# Patient Record
Sex: FEMALE | Race: WHITE | NOT HISPANIC OR LATINO | URBAN - METROPOLITAN AREA
[De-identification: names, ages, dates, MRNs, and addresses within clinical notes are randomized per-mention and may not be internally consistent; named-entity substitution may affect disease eponyms.]

---

## 2022-06-27 NOTE — PROCEDURE NOTE: CLINICAL
PROCEDURE NOTE: Botox Specialty All 4 Lids. Diagnosis: Blepharospasm. Prior to treatment, the risks/benefits/alternatives were discussed. The patient wished to proceed with procedure. Refer to template for location and amounts of injections. Lot #: null. Expiration Date: . EXP. Inventory Id: null. Total Units Used: *. Patient tolerated procedure well. There were no complications. Post procedure instructions given. Mohinder Wagner

## 2022-07-09 ENCOUNTER — TELEPHONE ENCOUNTER (OUTPATIENT)
Dept: URBAN - METROPOLITAN AREA CLINIC 121 | Facility: CLINIC | Age: 87
End: 2022-07-09

## 2022-07-10 ENCOUNTER — TELEPHONE ENCOUNTER (OUTPATIENT)
Dept: URBAN - METROPOLITAN AREA CLINIC 121 | Facility: CLINIC | Age: 87
End: 2022-07-10

## 2022-08-11 NOTE — PATIENT DISCUSSION
All looks good after botox. Patient states that eye feels like it's jumpoing even thought she can't see it. Instructed patient that it takes several times of having botox for the full effect. Patient has not met her deductible and states she cannot pay out of pocket. Patient to call when ready for botox again.

## 2022-10-20 ENCOUNTER — NEW PATIENT (OUTPATIENT)
Dept: URBAN - METROPOLITAN AREA CLINIC 26 | Facility: CLINIC | Age: 87
End: 2022-10-20

## 2022-10-20 VITALS
SYSTOLIC BLOOD PRESSURE: 178 MMHG | HEIGHT: 61 IN | BODY MASS INDEX: 19.45 KG/M2 | WEIGHT: 103 LBS | HEART RATE: 56 BPM | DIASTOLIC BLOOD PRESSURE: 71 MMHG

## 2022-10-20 DIAGNOSIS — E11.3391: ICD-10-CM

## 2022-10-20 DIAGNOSIS — H43.813: ICD-10-CM

## 2022-10-20 DIAGNOSIS — H43.11: ICD-10-CM

## 2022-10-20 DIAGNOSIS — E11.3312: ICD-10-CM

## 2022-10-20 DIAGNOSIS — Z79.4: ICD-10-CM

## 2022-10-20 PROCEDURE — 92134 CPTRZ OPH DX IMG PST SGM RTA: CPT

## 2022-10-20 PROCEDURE — 99204 OFFICE O/P NEW MOD 45 MIN: CPT

## 2022-10-20 PROCEDURE — 92250 FUNDUS PHOTOGRAPHY W/I&R: CPT

## 2022-10-20 ASSESSMENT — TONOMETRY
OS_IOP_MMHG: 07
OD_IOP_MMHG: 10

## 2022-10-20 ASSESSMENT — VISUAL ACUITY
OD_SC: 20/50-1
OD_PH: 20/25
OS_PH: 20/25
OS_SC: 20/40-1

## 2022-11-14 ENCOUNTER — COMPREHENSIVE EXAM (OUTPATIENT)
Dept: URBAN - METROPOLITAN AREA CLINIC 26 | Facility: CLINIC | Age: 87
End: 2022-11-14

## 2022-11-14 VITALS
DIASTOLIC BLOOD PRESSURE: 63 MMHG | HEIGHT: 61 IN | SYSTOLIC BLOOD PRESSURE: 171 MMHG | WEIGHT: 107 LBS | BODY MASS INDEX: 20.2 KG/M2 | HEART RATE: 63 BPM

## 2022-11-14 DIAGNOSIS — E11.3391: ICD-10-CM

## 2022-11-14 DIAGNOSIS — Z79.4: ICD-10-CM

## 2022-11-14 DIAGNOSIS — H43.813: ICD-10-CM

## 2022-11-14 DIAGNOSIS — E11.3312: ICD-10-CM

## 2022-11-14 DIAGNOSIS — H43.11: ICD-10-CM

## 2022-11-14 PROCEDURE — 92235 FLUORESCEIN ANGRPH MLTIFRAME: CPT

## 2022-11-14 PROCEDURE — 67210 TREATMENT OF RETINAL LESION: CPT

## 2022-11-14 PROCEDURE — 92250 FUNDUS PHOTOGRAPHY W/I&R: CPT

## 2022-11-14 PROCEDURE — 92014 COMPRE OPH EXAM EST PT 1/>: CPT

## 2022-11-14 ASSESSMENT — VISUAL ACUITY
OD_SC: 20/30-2
OS_SC: 20/50-1
OS_PH: 20/30-2

## 2022-11-14 ASSESSMENT — TONOMETRY
OD_IOP_MMHG: 16
OS_IOP_MMHG: 11

## 2023-01-06 ENCOUNTER — COMPREHENSIVE EXAM (OUTPATIENT)
Dept: URBAN - METROPOLITAN AREA CLINIC 26 | Facility: CLINIC | Age: 88
End: 2023-01-06

## 2023-01-06 DIAGNOSIS — Z79.4: ICD-10-CM

## 2023-01-06 DIAGNOSIS — E11.3312: ICD-10-CM

## 2023-01-06 DIAGNOSIS — E11.3391: ICD-10-CM

## 2023-01-06 PROCEDURE — 99024 POSTOP FOLLOW-UP VISIT: CPT

## 2023-01-06 ASSESSMENT — VISUAL ACUITY
OD_SC: 20/60
OS_SC: 20/30-2